# Patient Record
Sex: MALE | Race: WHITE | Employment: UNEMPLOYED | URBAN - METROPOLITAN AREA
[De-identification: names, ages, dates, MRNs, and addresses within clinical notes are randomized per-mention and may not be internally consistent; named-entity substitution may affect disease eponyms.]

---
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
## 2019-01-09 ENCOUNTER — OFFICE VISIT (OUTPATIENT)
Dept: FAMILY MEDICINE | Facility: CLINIC | Age: 19
End: 2019-01-09
Payer: MEDICAID

## 2019-01-09 VITALS
TEMPERATURE: 97.6 F | BODY MASS INDEX: 25.06 KG/M2 | HEART RATE: 57 BPM | OXYGEN SATURATION: 99 % | WEIGHT: 185 LBS | SYSTOLIC BLOOD PRESSURE: 127 MMHG | HEIGHT: 72 IN | DIASTOLIC BLOOD PRESSURE: 80 MMHG

## 2019-01-09 DIAGNOSIS — R20.0 RIGHT FACIAL NUMBNESS: ICD-10-CM

## 2019-01-09 DIAGNOSIS — S06.9X9S TRAUMATIC BRAIN INJURY WITH LOSS OF CONSCIOUSNESS, SEQUELA (H): Primary | ICD-10-CM

## 2019-01-09 PROCEDURE — 99214 OFFICE O/P EST MOD 30 MIN: CPT | Performed by: FAMILY MEDICINE

## 2019-01-09 RX ORDER — PROPRANOLOL HYDROCHLORIDE 10 MG/1
TABLET ORAL
COMMUNITY
Start: 2018-12-06 | End: 2019-03-15

## 2019-01-09 RX ORDER — TRAZODONE HYDROCHLORIDE 50 MG/1
50 TABLET, FILM COATED ORAL
COMMUNITY
Start: 2019-01-04 | End: 2019-03-15

## 2019-01-09 ASSESSMENT — MIFFLIN-ST. JEOR: SCORE: 1884.21

## 2019-01-09 NOTE — PATIENT INSTRUCTIONS
1. Set up an appointment with one of the following:     [facility] - [phone]     [facility] - Trav Carmichael [phone]        [facility] - Matt [phone]      2. Let me see you in 6 months for a physical with fasting labs - sooner if needed.

## 2019-02-25 ENCOUNTER — TELEPHONE (OUTPATIENT)
Dept: FAMILY MEDICINE | Facility: CLINIC | Age: 19
End: 2019-02-25

## 2019-02-25 NOTE — TELEPHONE ENCOUNTER
I called and spoke to pt mother.  He has been seen by his specialist who advised they need to see the pmd and possible get a pulmonology referral.  Pt just seems to breath more heavily when exerting himself and sometimes breaths audibly when sitting at rest.  These are not new acute sx and are ok to wait for appointment on 3/8/19 per mom.  Advised if any worsening of sx let us know and we will see if we can work him in sooner. Mother agrees to plan.  Maria Esther VALEN, RN
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
## 2019-03-08 ENCOUNTER — OFFICE VISIT (OUTPATIENT)
Dept: FAMILY MEDICINE | Facility: CLINIC | Age: 19
End: 2019-03-08
Payer: MEDICAID

## 2019-03-08 VITALS
TEMPERATURE: 99.1 F | BODY MASS INDEX: 25.6 KG/M2 | SYSTOLIC BLOOD PRESSURE: 127 MMHG | OXYGEN SATURATION: 99 % | DIASTOLIC BLOOD PRESSURE: 73 MMHG | HEART RATE: 63 BPM | HEIGHT: 72 IN | WEIGHT: 189 LBS | RESPIRATION RATE: 20 BRPM

## 2019-03-08 DIAGNOSIS — R06.1 STRIDOR: Primary | ICD-10-CM

## 2019-03-08 DIAGNOSIS — Z23 NEED FOR PROPHYLACTIC VACCINATION AND INOCULATION AGAINST INFLUENZA: ICD-10-CM

## 2019-03-08 PROCEDURE — 99213 OFFICE O/P EST LOW 20 MIN: CPT | Mod: 25 | Performed by: FAMILY MEDICINE

## 2019-03-08 PROCEDURE — 90686 IIV4 VACC NO PRSV 0.5 ML IM: CPT | Performed by: FAMILY MEDICINE

## 2019-03-08 PROCEDURE — 90471 IMMUNIZATION ADMIN: CPT | Performed by: FAMILY MEDICINE

## 2019-03-08 ASSESSMENT — PAIN SCALES - GENERAL: PAINLEVEL: NO PAIN (0)

## 2019-03-08 ASSESSMENT — MIFFLIN-ST. JEOR: SCORE: 1902.36
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
## 2019-03-08 NOTE — PATIENT INSTRUCTIONS
1. Set up an appointment with our ENT specialist - [phone].    2. Let me see you around July for a physical with fasting labs.

## 2019-03-08 NOTE — NURSING NOTE
"Chief Complaint   Patient presents with     Breathing Problem     Cough       Initial /73   Pulse 63   Temp 99.1  F (37.3  C) (Oral)   Resp 20   Ht 1.816 m (5' 11.5\")   Wt 85.7 kg (189 lb)   SpO2 99%   BMI 25.99 kg/m   Estimated body mass index is 25.99 kg/m  as calculated from the following:    Height as of this encounter: 1.816 m (5' 11.5\").    Weight as of this encounter: 85.7 kg (189 lb).    Maria Esther New CMA    "
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
## 2019-03-15 ENCOUNTER — OFFICE VISIT (OUTPATIENT)
Dept: OTOLARYNGOLOGY | Facility: CLINIC | Age: 19
End: 2019-03-15
Payer: MEDICAID

## 2019-03-15 VITALS
HEIGHT: 72 IN | BODY MASS INDEX: 25.6 KG/M2 | WEIGHT: 189 LBS | SYSTOLIC BLOOD PRESSURE: 110 MMHG | DIASTOLIC BLOOD PRESSURE: 60 MMHG

## 2019-03-15 DIAGNOSIS — J38.6 SUBGLOTTIC STENOSIS: Primary | ICD-10-CM

## 2019-03-15 PROCEDURE — 99244 OFF/OP CNSLTJ NEW/EST MOD 40: CPT | Mod: 25 | Performed by: OTOLARYNGOLOGY

## 2019-03-15 PROCEDURE — 31575 DIAGNOSTIC LARYNGOSCOPY: CPT | Performed by: OTOLARYNGOLOGY

## 2019-03-15 ASSESSMENT — MIFFLIN-ST. JEOR: SCORE: 1902.36
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
## 2019-03-15 NOTE — PATIENT INSTRUCTIONS
General Scheduling Information  To schedule your CT/MRI scan, please contact [place] [facility] at [phone]   [address] [place] [address]    To schedule your Surgery, please contact our Specialty Schedulers at [phone]    ENT Clinic Locations Clinic Hours Telephone Number     Melia Desir  [address] [place] [address]   Tuesday:       8:00am -- 4:00pm    Wednesday:  8:00am - 4:00pm   To schedule an appointment with   Dr. Villalba,   please contact our   Specialty Scheduling Department at:     [phone]       Melia Wilkerson  [address] [place] [address]   Friday:          8:00am - 4:00pm         Urgent Care Locations Clinic Hours Telephone Numbers     Melia Myers  [address] [place] [address]     Monday-Friday:     11:00pm - 9:00pm    Saturday-Sunday:  9:00am - 5:00pm   [phone]     Melia Wilkerson  [address] [place] [address]     Monday-Friday:      5:00pm - 9:00pm     Saturday-Sunday:  9:00am - 5:00pm   [phone]
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
## 2019-04-26 ENCOUNTER — TELEPHONE (OUTPATIENT)
Dept: OTOLARYNGOLOGY | Facility: CLINIC | Age: 19
End: 2019-04-26

## 2019-04-26 DIAGNOSIS — J38.6 SUBGLOTTIC STENOSIS: Primary | ICD-10-CM

## 2019-04-26 NOTE — TELEPHONE ENCOUNTER
I spoke with the patients mother and gave her the number for imaging and instructed her to have those results sent to the [facility] where  referred them once the CT is finished. Patients mother agreed. She was also wondering if  was going to follow up with the results or if the results and questions she has will be answered by the Laryngologist at the [facility]?

## 2019-04-26 NOTE — TELEPHONE ENCOUNTER
Called 3 different number listed on and for Nigel none of them were Leigh's his mother also no answer unable to leave a message with the following details     [facility] [phone]      Yadi Chisholm

## 2019-04-26 NOTE — TELEPHONE ENCOUNTER
Mom is calling stating patient was in to see provider, was advised to go in for CT, but mom lost information and would like to know where provider wanted patient to go for CT. Please call to advise. OK to leave detailed message on vm. Thank you.

## 2019-05-02 ENCOUNTER — ANCILLARY PROCEDURE (OUTPATIENT)
Dept: CT IMAGING | Facility: CLINIC | Age: 19
End: 2019-05-02
Attending: OTOLARYNGOLOGY
Payer: MEDICAID

## 2019-05-02 DIAGNOSIS — J38.6 SUBGLOTTIC STENOSIS: ICD-10-CM

## 2019-05-02 PROCEDURE — 70491 CT SOFT TISSUE NECK W/DYE: CPT | Mod: TC

## 2019-05-02 RX ORDER — IOPAMIDOL 755 MG/ML
80 INJECTION, SOLUTION INTRAVASCULAR ONCE
Status: COMPLETED | OUTPATIENT
Start: 2019-05-02 | End: 2019-05-02

## 2019-05-02 RX ADMIN — IOPAMIDOL 80 ML: 755 INJECTION, SOLUTION INTRAVASCULAR at 08:43

## 2019-05-02 NOTE — TELEPHONE ENCOUNTER
I reviewed the patient's CT with him and mom. He has a tracheal web with tracheal stenosis. I recommend he see laryngology at the [facility] to have this evaluated and likely fixed.
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
## 2019-09-26 ENCOUNTER — OFFICE VISIT (OUTPATIENT)
Dept: FAMILY MEDICINE | Facility: CLINIC | Age: 19
End: 2019-09-26
Payer: COMMERCIAL

## 2019-09-26 VITALS
BODY MASS INDEX: 26.68 KG/M2 | HEIGHT: 72 IN | SYSTOLIC BLOOD PRESSURE: 132 MMHG | OXYGEN SATURATION: 99 % | WEIGHT: 197 LBS | TEMPERATURE: 98.4 F | DIASTOLIC BLOOD PRESSURE: 79 MMHG | HEART RATE: 81 BPM | RESPIRATION RATE: 14 BRPM

## 2019-09-26 DIAGNOSIS — F32.0 MILD MAJOR DEPRESSION (H): ICD-10-CM

## 2019-09-26 DIAGNOSIS — S06.9X9S TRAUMATIC BRAIN INJURY WITH LOSS OF CONSCIOUSNESS, SEQUELA (H): ICD-10-CM

## 2019-09-26 DIAGNOSIS — Z00.00 ROUTINE GENERAL MEDICAL EXAMINATION AT A HEALTH CARE FACILITY: Primary | ICD-10-CM

## 2019-09-26 DIAGNOSIS — F41.9 ANXIETY: ICD-10-CM

## 2019-09-26 DIAGNOSIS — G47.00 INSOMNIA, UNSPECIFIED TYPE: ICD-10-CM

## 2019-09-26 DIAGNOSIS — F90.9 ATTENTION DEFICIT HYPERACTIVITY DISORDER (ADHD), UNSPECIFIED ADHD TYPE: ICD-10-CM

## 2019-09-26 LAB
ALBUMIN UR-MCNC: NEGATIVE MG/DL
APPEARANCE UR: CLEAR
BASOPHILS # BLD AUTO: 0 10E9/L (ref 0–0.2)
BASOPHILS NFR BLD AUTO: 0.2 %
BILIRUB UR QL STRIP: NEGATIVE
COLOR UR AUTO: YELLOW
DIFFERENTIAL METHOD BLD: NORMAL
EOSINOPHIL # BLD AUTO: 0.2 10E9/L (ref 0–0.7)
EOSINOPHIL NFR BLD AUTO: 1.6 %
ERYTHROCYTE [DISTWIDTH] IN BLOOD BY AUTOMATED COUNT: 13.6 % (ref 10–15)
GLUCOSE UR STRIP-MCNC: NEGATIVE MG/DL
HCT VFR BLD AUTO: 45.2 % (ref 40–53)
HGB BLD-MCNC: 15.2 G/DL (ref 13.3–17.7)
HGB UR QL STRIP: NEGATIVE
KETONES UR STRIP-MCNC: NEGATIVE MG/DL
LEUKOCYTE ESTERASE UR QL STRIP: NEGATIVE
LYMPHOCYTES # BLD AUTO: 2.5 10E9/L (ref 0.8–5.3)
LYMPHOCYTES NFR BLD AUTO: 26.4 %
MCH RBC QN AUTO: 31.7 PG (ref 26.5–33)
MCHC RBC AUTO-ENTMCNC: 33.6 G/DL (ref 31.5–36.5)
MCV RBC AUTO: 94 FL (ref 78–100)
MONOCYTES # BLD AUTO: 0.7 10E9/L (ref 0–1.3)
MONOCYTES NFR BLD AUTO: 7.6 %
NEUTROPHILS # BLD AUTO: 6 10E9/L (ref 1.6–8.3)
NEUTROPHILS NFR BLD AUTO: 64.2 %
NITRATE UR QL: NEGATIVE
PH UR STRIP: 7 PH (ref 5–7)
PLATELET # BLD AUTO: 243 10E9/L (ref 150–450)
RBC # BLD AUTO: 4.79 10E12/L (ref 4.4–5.9)
SOURCE: NORMAL
SP GR UR STRIP: 1.01 (ref 1–1.03)
UROBILINOGEN UR STRIP-ACNC: 0.2 EU/DL (ref 0.2–1)
WBC # BLD AUTO: 9.3 10E9/L (ref 4–11)

## 2019-09-26 PROCEDURE — 99395 PREV VISIT EST AGE 18-39: CPT | Performed by: PHYSICIAN ASSISTANT

## 2019-09-26 PROCEDURE — 81003 URINALYSIS AUTO W/O SCOPE: CPT | Performed by: PHYSICIAN ASSISTANT

## 2019-09-26 PROCEDURE — 85025 COMPLETE CBC W/AUTO DIFF WBC: CPT | Performed by: PHYSICIAN ASSISTANT

## 2019-09-26 PROCEDURE — 36415 COLL VENOUS BLD VENIPUNCTURE: CPT | Performed by: PHYSICIAN ASSISTANT

## 2019-09-26 RX ORDER — TRAZODONE HYDROCHLORIDE 50 MG/1
50 TABLET, FILM COATED ORAL PRN
COMMUNITY
Start: 2019-09-25

## 2019-09-26 RX ORDER — VENLAFAXINE HYDROCHLORIDE 75 MG/1
75 CAPSULE, EXTENDED RELEASE ORAL DAILY
Refills: 0 | COMMUNITY
Start: 2019-09-04

## 2019-09-26 RX ORDER — VENLAFAXINE HYDROCHLORIDE 150 MG/1
150 CAPSULE, EXTENDED RELEASE ORAL DAILY
COMMUNITY
Start: 2019-09-25

## 2019-09-26 RX ORDER — DIVALPROEX SODIUM 250 MG/1
250 TABLET, EXTENDED RELEASE ORAL 2 TIMES DAILY
COMMUNITY
Start: 2019-06-11

## 2019-09-26 ASSESSMENT — ENCOUNTER SYMPTOMS
CONSTIPATION: 0
HEADACHES: 0
PARESTHESIAS: 0
CHILLS: 0
HEMATOCHEZIA: 0
JOINT SWELLING: 0
SHORTNESS OF BREATH: 0
DYSURIA: 0
ABDOMINAL PAIN: 0
DIZZINESS: 0
HEARTBURN: 0
PALPITATIONS: 0
SORE THROAT: 0
EYE PAIN: 0
COUGH: 0
DIARRHEA: 0
FREQUENCY: 0
FEVER: 0
ARTHRALGIAS: 0
MYALGIAS: 0
WEAKNESS: 0
NERVOUS/ANXIOUS: 0
NAUSEA: 0
HEMATURIA: 0

## 2019-09-26 ASSESSMENT — MIFFLIN-ST. JEOR: SCORE: 1938.65
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
## 2020-05-01 ENCOUNTER — TELEPHONE (OUTPATIENT)
Dept: LAB | Facility: CLINIC | Age: 20
End: 2020-05-01

## 2020-05-01 NOTE — TELEPHONE ENCOUNTER
//.Left message for patient to call in regards to 24 hour pre-appointment COVID screening. Patient has an appointment at 3PM on 5/4/2020. Please ask infection screening questions and update appointment notes on 5/4/20 LAB schedule.

## 2020-06-17 ENCOUNTER — VIRTUAL VISIT (OUTPATIENT)
Dept: FAMILY MEDICINE | Facility: CLINIC | Age: 20
End: 2020-06-17
Payer: COMMERCIAL

## 2020-06-17 DIAGNOSIS — L70.0 ACNE VULGARIS: Primary | ICD-10-CM

## 2020-06-17 PROCEDURE — 99213 OFFICE O/P EST LOW 20 MIN: CPT | Mod: 95 | Performed by: FAMILY MEDICINE

## 2020-06-17 RX ORDER — MINOCYCLINE HYDROCHLORIDE 100 MG/1
100 CAPSULE ORAL DAILY
Qty: 30 CAPSULE | Refills: 2 | Status: SHIPPED | OUTPATIENT
Start: 2020-06-17 | End: 2020-08-31
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
## 2020-06-17 NOTE — PATIENT INSTRUCTIONS
Take Minocycline 100 mg daily as needed for acne - avoid the sun or wear sunscreen as it can make your skin sensitive.    See you in 3 months for a physical with fasting labs.

## 2020-06-19 ENCOUNTER — TELEPHONE (OUTPATIENT)
Dept: FAMILY MEDICINE | Facility: CLINIC | Age: 20
End: 2020-06-19

## 2020-06-25 ENCOUNTER — ANCILLARY PROCEDURE (OUTPATIENT)
Dept: GENERAL RADIOLOGY | Facility: CLINIC | Age: 20
End: 2020-06-25
Attending: FAMILY MEDICINE
Payer: COMMERCIAL

## 2020-06-25 ENCOUNTER — OFFICE VISIT (OUTPATIENT)
Dept: ORTHOPEDICS | Facility: CLINIC | Age: 20
End: 2020-06-25
Payer: COMMERCIAL

## 2020-06-25 VITALS
BODY MASS INDEX: 27.55 KG/M2 | WEIGHT: 203.4 LBS | SYSTOLIC BLOOD PRESSURE: 110 MMHG | HEIGHT: 72 IN | DIASTOLIC BLOOD PRESSURE: 64 MMHG

## 2020-06-25 DIAGNOSIS — S93.402A SPRAIN OF LEFT ANKLE, UNSPECIFIED LIGAMENT, INITIAL ENCOUNTER: Primary | ICD-10-CM

## 2020-06-25 DIAGNOSIS — M25.572 LEFT ANKLE PAIN: ICD-10-CM

## 2020-06-25 DIAGNOSIS — M25.572 ACUTE LEFT ANKLE PAIN: ICD-10-CM

## 2020-06-25 PROCEDURE — 73610 X-RAY EXAM OF ANKLE: CPT | Mod: LT

## 2020-06-25 PROCEDURE — 99204 OFFICE O/P NEW MOD 45 MIN: CPT | Performed by: FAMILY MEDICINE

## 2020-06-25 ASSESSMENT — MIFFLIN-ST. JEOR: SCORE: 1962.68

## 2020-06-25 NOTE — PROGRESS NOTES
ASSESSMENT & PLAN  Nigel was seen today for pain.    Diagnoses and all orders for this visit:    Sprain of left ankle, unspecified ligament, initial encounter    Acute left ankle pain  -     XR Ankle Left G/E 3 Views; Future      Patient is a 19 year old male presenting for evaluation of   Chief Complaint   Patient presents with     Left Ankle - Pain      # Left Ankle Sprain: Notable after inversion injury on 6/21/2020 with associated swelling and ecchymosis over the lateral ankle.  Symptoms have significantly improved both pain and swelling.  Patient having mild tenderness to palpation over the ATFL, some posterior ankle pain with otherwise full range of motion and able to hop on 1 foot with minimal symptoms.  X-rays today negative for fracture.  Counseled patient on nature of condition and treatment options.  Given this plan as below, follow-up PRN.    Treatment: Activities as tolerated  Physical Therapy HEP given today, if not improved can refer for formal PT  Medications: Limited NSAIDs/Tylenol    Concerning signs/sx that would warrant urgent evaluation were discussed.  All questions were answered, patient understands and agrees with plan.      Return if symptoms worsen or fail to improve.      -----    SUBJECTIVE  Nigel Ladd is a/an 19 year old male who is seen as a self referral for evaluation of left ankle pain. The patient is seen by themselves.    Onset: 6/21/20, 4 day(s) ago. Patient describes injury as missed step and inverted ankle.   Location of Pain: left lateral ankle   Rating of Pain at worst: 6-7/10  Rating of Pain Currently: 2/10  Worsened by: dorsiflexion   Better with: rest   Treatments tried: rest/activity avoidance and elevation  Associated symptoms: swelling and bruising   Orthopedic history: NO  Relevant surgical history: NO  Past Medical History:   Diagnosis Date     NO ACTIVE PROBLEMS      Social History     Socioeconomic History     Marital status: Single     Spouse name: None  "    Number of children: None     Years of education: None     Highest education level: None   Occupational History     None   Social Needs     Financial resource strain: None     Food insecurity     Worry: None     Inability: None     Transportation needs     Medical: None     Non-medical: None   Tobacco Use     Smoking status: Current Every Day Smoker     Packs/day: 0.00     Smokeless tobacco: Never Used     Tobacco comment: Lives in smoke free household   Substance and Sexual Activity     Alcohol use: No     Drug use: No     Sexual activity: Never   Lifestyle     Physical activity     Days per week: None     Minutes per session: None     Stress: None   Relationships     Social connections     Talks on phone: None     Gets together: None     Attends Zoroastrianism service: None     Active member of club or organization: None     Attends meetings of clubs or organizations: None     Relationship status: None     Intimate partner violence     Fear of current or ex partner: None     Emotionally abused: None     Physically abused: None     Forced sexual activity: None   Other Topics Concern     None   Social History Narrative     None         Patient's past medical, surgical, social, and family histories were reviewed today and no changes are noted.  No family history pertinent to the patient's problem today    REVIEW OF SYSTEMS:  10 point ROS is negative other than symptoms noted above in HPI, Past Medical History or as stated below  Constitutional: NEGATIVE for fever, chills, change in weight  Skin: NEGATIVE for worrisome rashes, moles or lesions  GI/: NEGATIVE for bowel or bladder changes  Neuro: NEGATIVE for weakness, dizziness or paresthesias    OBJECTIVE:  /64   Ht 1.816 m (5' 11.5\")   Wt 92.3 kg (203 lb 6.4 oz)   BMI 27.97 kg/m     General: healthy, alert and in no distress  HEENT: no scleral icterus or conjunctival erythema  Skin: no suspicious lesions or rash. No jaundice.  CV:  no pedal edema  Resp: " normal respiratory effort without conversational dyspnea   Psych: normal mood and affect  Gait: normal steady gait with appropriate coordination and balance  Neuro: Normal light sensory exam of lower extremity  MSK:  LEFT ANKLE  Inspection:    Lateral ankle swelling/bruising  Palpation:    Tender about the ATFL and posterior ankle. Remainder of bony and ligamentous landmarks are nontender.  Range of Motion:     Plantarflexion limited slightly by pain / dorsiflexion limited slightly by pain / inversion full / eversion full  Strength:    full  Special Tests:    negative anterior drawer, negative talar tilt, negative valgus stress, negative forced external rotation/eversion, negative Beebe sign, negative squeeze test. Able to perform heel raise and Able to hop.    LEFT FOOT  Inspection:    no swelling or ecchymosis  Palpation:  Non-tender.  Range of Motion:     Grossly intact and non-painful  Strength:    Grossly intact in all planes  Special Tests:    Positive: None    Negative: calcaneal squeeze, MTP stress and Lisfranc joint tenderness    Independent visualization of the below image:  No results found for this or any previous visit (from the past 24 hour(s)).  XR ANKLE LT G/E 3 VW 6/25/2020 3:57 PM      HISTORY: Left ankle pain     COMPARISON: None.                                                                      IMPRESSION: No fractures are evident. The ankle mortise is intact. The  talar dome is unremarkable.      TOVA AMARO MD  I  ordered, visualized and reviewed these images with the patient  No fracture or dislocation of left ankle    Lio Shore MD [facility]

## 2020-06-25 NOTE — PATIENT INSTRUCTIONS
Diagnosis: Left ankle injury  Image Findings: no fracture on x-ray  Treatment: ankle wrap from home, home exercises given today  Job: No restrictions  Medications: Limited tylenol/ibuprofen for pain for 1-2 weeks  Follow-up: As needed if not improving or worsening    It was great seeing you today!    Lio Shore

## 2020-06-25 NOTE — LETTER
6/25/2020         RE: Nigel Ladd  [address] [facility] [address]  [place] [address]        Dear Colleague,    Thank you for referring your patient, Nigel Ladd, to the [facility]. Please see a copy of my visit note below.    ASSESSMENT & PLAN  Nigel was seen today for pain.    Diagnoses and all orders for this visit:    Sprain of left ankle, unspecified ligament, initial encounter    Acute left ankle pain  -     XR Ankle Left G/E 3 Views; Future      Patient is a 19 year old male presenting for evaluation of   Chief Complaint   Patient presents with     Left Ankle - Pain      # Left Ankle Sprain: Notable after inversion injury on 6/21/2020 with associated swelling and ecchymosis over the lateral ankle.  Symptoms have significantly improved both pain and swelling.  Patient having mild tenderness to palpation over the ATFL, some posterior ankle pain with otherwise full range of motion and able to hop on 1 foot with minimal symptoms.  X-rays today negative for fracture.  Counseled patient on nature of condition and treatment options.  Given this plan as below, follow-up PRN.    Treatment: Activities as tolerated  Physical Therapy HEP given today, if not improved can refer for formal PT  Medications: Limited NSAIDs/Tylenol    Concerning signs/sx that would warrant urgent evaluation were discussed.  All questions were answered, patient understands and agrees with plan.      Return if symptoms worsen or fail to improve.      -----    SUBJECTIVE  Nigel Ladd is a/an 19 year old male who is seen as a self referral for evaluation of left ankle pain. The patient is seen by themselves.    Onset: 6/21/20, 4 day(s) ago. Patient describes injury as missed step and inverted ankle.   Location of Pain: left lateral ankle   Rating of Pain at worst: 6-7/10  Rating of Pain Currently: 2/10  Worsened by: dorsiflexion   Better with: rest   Treatments tried: rest/activity avoidance  and elevation  Associated symptoms: swelling and bruising   Orthopedic history: NO  Relevant surgical history: NO  Past Medical History:   Diagnosis Date     NO ACTIVE PROBLEMS      Social History     Socioeconomic History     Marital status: Single     Spouse name: None     Number of children: None     Years of education: None     Highest education level: None   Occupational History     None   Social Needs     Financial resource strain: None     Food insecurity     Worry: None     Inability: None     Transportation needs     Medical: None     Non-medical: None   Tobacco Use     Smoking status: Current Every Day Smoker     Packs/day: 0.00     Smokeless tobacco: Never Used     Tobacco comment: Lives in smoke free household   Substance and Sexual Activity     Alcohol use: No     Drug use: No     Sexual activity: Never   Lifestyle     Physical activity     Days per week: None     Minutes per session: None     Stress: None   Relationships     Social connections     Talks on phone: None     Gets together: None     Attends [employer]ah[employer] service: None     Active member of club or organization: None     Attends meetings of clubs or organizations: None     Relationship status: None     Intimate partner violence     Fear of current or ex partner: None     Emotionally abused: None     Physically abused: None     Forced sexual activity: None   Other Topics Concern     None   Social History Narrative     None         Patient's past medical, surgical, social, and family histories were reviewed today and no changes are noted.  No family history pertinent to the patient's problem today    REVIEW OF SYSTEMS:  10 point ROS is negative other than symptoms noted above in HPI, Past Medical History or as stated below  Constitutional: NEGATIVE for fever, chills, change in weight  Skin: NEGATIVE for worrisome rashes, moles or lesions  GI/: NEGATIVE for bowel or bladder changes  Neuro: NEGATIVE for weakness, dizziness or  "paresthesias    OBJECTIVE:  /64   Ht 1.816 m (5' 11.5\")   Wt 92.3 kg (203 lb 6.4 oz)   BMI 27.97 kg/m     General: healthy, alert and in no distress  HEENT: no scleral icterus or conjunctival erythema  Skin: no suspicious lesions or rash. No jaundice.  CV:  no pedal edema  Resp: normal respiratory effort without conversational dyspnea   Psych: normal mood and affect  Gait: normal steady gait with appropriate coordination and balance  Neuro: Normal light sensory exam of lower extremity  MSK:  LEFT ANKLE  Inspection:    Lateral ankle swelling/bruising  Palpation:    Tender about the ATFL and posterior ankle. Remainder of bony and ligamentous landmarks are nontender.  Range of Motion:     Plantarflexion limited slightly by pain / dorsiflexion limited slightly by pain / inversion full / eversion full  Strength:    full  Special Tests:    negative anterior drawer, negative talar tilt, negative valgus stress, negative forced external rotation/eversion, negative Beebe sign, negative squeeze test. Able to perform heel raise and Able to hop.    LEFT FOOT  Inspection:    no swelling or ecchymosis  Palpation:  Non-tender.  Range of Motion:     Grossly intact and non-painful  Strength:    Grossly intact in all planes  Special Tests:    Positive: None    Negative: calcaneal squeeze, MTP stress and Lisfranc joint tenderness    Independent visualization of the below image:  No results found for this or any previous visit (from the past 24 hour(s)).  XR ANKLE LT G/E 3 VW 6/25/2020 3:57 PM      HISTORY: Left ankle pain     COMPARISON: None.                                                                      IMPRESSION: No fractures are evident. The ankle mortise is intact. The  talar dome is unremarkable.      TOVA AMARO MD  I  ordered, visualized and reviewed these images with the patient  No fracture or dislocation of left ankle    Lio Shore MD [facility]      Again, thank " you for allowing me to participate in the care of your patient.        Sincerely,        Lio Shore MD

## 2020-08-28 DIAGNOSIS — L70.0 ACNE VULGARIS: ICD-10-CM

## 2020-08-31 RX ORDER — MINOCYCLINE HYDROCHLORIDE 100 MG/1
CAPSULE ORAL
Qty: 30 CAPSULE | Refills: 11 | Status: SHIPPED | OUTPATIENT
Start: 2020-08-31

## 2020-08-31 NOTE — TELEPHONE ENCOUNTER
Minocycline for acne refill requested  Prescribed in June.  Please add acne vulgaris to problem list.  To MD to advise on refill request.

## 2021-10-19 PROBLEM — F32.9 MAJOR DEPRESSION: Status: ACTIVE | Noted: 2019-09-26

## 2022-02-16 ENCOUNTER — TELEPHONE (OUTPATIENT)
Dept: FAMILY MEDICINE | Facility: CLINIC | Age: 22
End: 2022-02-16
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
## 2022-02-16 NOTE — TELEPHONE ENCOUNTER
Patient is calling, requesting a note from Dr Maria that he has a TBI, and sent to his dental office.    [facility]  [address] [place] [address] [place] [address]    Kassie Macdonald MA/JAS